# Patient Record
Sex: MALE | Race: WHITE | URBAN - METROPOLITAN AREA
[De-identification: names, ages, dates, MRNs, and addresses within clinical notes are randomized per-mention and may not be internally consistent; named-entity substitution may affect disease eponyms.]

---

## 2019-12-03 ENCOUNTER — APPOINTMENT (RX ONLY)
Dept: URBAN - METROPOLITAN AREA CLINIC 61 | Facility: CLINIC | Age: 69
Setting detail: DERMATOLOGY
End: 2019-12-03

## 2019-12-03 DIAGNOSIS — D18.0 HEMANGIOMA: ICD-10-CM

## 2019-12-03 DIAGNOSIS — I87.2 VENOUS INSUFFICIENCY (CHRONIC) (PERIPHERAL): ICD-10-CM

## 2019-12-03 DIAGNOSIS — L82.1 OTHER SEBORRHEIC KERATOSIS: ICD-10-CM

## 2019-12-03 DIAGNOSIS — D69.2 OTHER NONTHROMBOCYTOPENIC PURPURA: ICD-10-CM

## 2019-12-03 DIAGNOSIS — L70.8 OTHER ACNE: ICD-10-CM

## 2019-12-03 DIAGNOSIS — L57.0 ACTINIC KERATOSIS: ICD-10-CM

## 2019-12-03 DIAGNOSIS — L81.4 OTHER MELANIN HYPERPIGMENTATION: ICD-10-CM

## 2019-12-03 DIAGNOSIS — Z71.89 OTHER SPECIFIED COUNSELING: ICD-10-CM

## 2019-12-03 DIAGNOSIS — D22 MELANOCYTIC NEVI: ICD-10-CM

## 2019-12-03 PROBLEM — D18.01 HEMANGIOMA OF SKIN AND SUBCUTANEOUS TISSUE: Status: ACTIVE | Noted: 2019-12-03

## 2019-12-03 PROBLEM — D22.9 MELANOCYTIC NEVI, UNSPECIFIED: Status: ACTIVE | Noted: 2019-12-03

## 2019-12-03 PROCEDURE — ? FULL BODY SKIN EXAM

## 2019-12-03 PROCEDURE — ? COUNSELING

## 2019-12-03 PROCEDURE — 99203 OFFICE O/P NEW LOW 30 MIN: CPT | Mod: 25

## 2019-12-03 PROCEDURE — ? LIQUID NITROGEN

## 2019-12-03 PROCEDURE — ? EXTRACTIONS

## 2019-12-03 PROCEDURE — 17000 DESTRUCT PREMALG LESION: CPT

## 2019-12-03 ASSESSMENT — LOCATION ZONE DERM
LOCATION ZONE: ARM
LOCATION ZONE: FACE
LOCATION ZONE: NOSE

## 2019-12-03 ASSESSMENT — LOCATION SIMPLE DESCRIPTION DERM
LOCATION SIMPLE: LEFT FOREARM
LOCATION SIMPLE: NOSE
LOCATION SIMPLE: LEFT FOREHEAD

## 2019-12-03 ASSESSMENT — LOCATION DETAILED DESCRIPTION DERM
LOCATION DETAILED: LEFT LATERAL FOREHEAD
LOCATION DETAILED: NASAL DORSUM
LOCATION DETAILED: LEFT VENTRAL PROXIMAL FOREARM

## 2019-12-03 NOTE — PROCEDURE: MIPS QUALITY
Quality 47: Advance Care Plan: Advance Care Planning discussed and documented; advance care plan or surrogate decision maker documented in the medical record.
Quality 130: Documentation Of Current Medications In The Medical Record: Current Medications Documented
Detail Level: Detailed
Quality 226: Preventive Care And Screening: Tobacco Use: Screening And Cessation Intervention: Patient screened for tobacco use and is an ex/non-smoker
Quality 431: Preventive Care And Screening: Unhealthy Alcohol Use - Screening: Patient screened for unhealthy alcohol use using a single question and scores less than 2 times per year
Quality 110: Preventive Care And Screening: Influenza Immunization: Influenza Immunization Administered during Influenza season
Quality 111:Pneumonia Vaccination Status For Older Adults: Pneumococcal Vaccination Previously Received

## 2019-12-03 NOTE — PROCEDURE: EXTRACTIONS
Render Number Of Lesions Treated: no
Detail Level: Detailed
Prep Text (Optional): Prior to removal the treatment areas were prepped in the usual fashion.
Consent was obtained and risks were reviewed including but not limited to scarring, infection, bleeding, scabbing, incomplete removal, and allergy to anesthesia.
Extraction Method: cotton-tipped applicators and gentle pressure
Post-Care Instructions: I reviewed with the patient in detail post-care instructions. Patient is to keep the treatment areas dry overnight, and then apply bacitracin twice daily until healed. Patient may apply hydrogen peroxide soaks to remove any crusting.
Acne Type: Comedonal Lesions

## 2021-08-03 ENCOUNTER — OFFICE VISIT (OUTPATIENT)
Dept: CARDIOLOGY CLINIC | Age: 71
End: 2021-08-03
Payer: OTHER GOVERNMENT

## 2021-08-03 VITALS
DIASTOLIC BLOOD PRESSURE: 84 MMHG | WEIGHT: 191 LBS | SYSTOLIC BLOOD PRESSURE: 127 MMHG | RESPIRATION RATE: 16 BRPM | HEART RATE: 74 BPM | TEMPERATURE: 98.1 F | OXYGEN SATURATION: 97 %

## 2021-08-03 DIAGNOSIS — Z01.818 PRE-OP TESTING: ICD-10-CM

## 2021-08-03 DIAGNOSIS — I25.10 CORONARY ARTERY DISEASE WITHOUT ANGINA PECTORIS, UNSPECIFIED VESSEL OR LESION TYPE, UNSPECIFIED WHETHER NATIVE OR TRANSPLANTED HEART: ICD-10-CM

## 2021-08-03 DIAGNOSIS — Z76.89 ENCOUNTER TO ESTABLISH CARE WITH NEW DOCTOR: ICD-10-CM

## 2021-08-03 DIAGNOSIS — R06.09 DOE (DYSPNEA ON EXERTION): Primary | ICD-10-CM

## 2021-08-03 PROCEDURE — 93000 ELECTROCARDIOGRAM COMPLETE: CPT | Performed by: INTERNAL MEDICINE

## 2021-08-03 PROCEDURE — 99204 OFFICE O/P NEW MOD 45 MIN: CPT | Performed by: INTERNAL MEDICINE

## 2021-08-03 RX ORDER — ALBUTEROL SULFATE 90 UG/1
AEROSOL, METERED RESPIRATORY (INHALATION)
COMMUNITY
Start: 2021-05-12 | End: 2022-05-13

## 2021-08-03 RX ORDER — CHLORTHALIDONE 25 MG/1
12.5 TABLET ORAL DAILY
Qty: 45 TABLET | Refills: 3 | Status: SHIPPED | OUTPATIENT
Start: 2021-08-03

## 2021-08-03 RX ORDER — ASPIRIN 81 MG/1
81 TABLET ORAL DAILY
Qty: 90 TABLET | Refills: 3 | Status: SHIPPED | OUTPATIENT
Start: 2021-08-03

## 2021-08-03 RX ORDER — OMEPRAZOLE 40 MG/1
CAPSULE, DELAYED RELEASE ORAL
COMMUNITY
Start: 2021-01-26 | End: 2022-01-27

## 2021-08-03 RX ORDER — ROSUVASTATIN CALCIUM 10 MG/1
5 TABLET, COATED ORAL DAILY
COMMUNITY
Start: 2021-04-08 | End: 2022-04-09

## 2021-08-03 NOTE — PROGRESS NOTES
[facility]    Mr. Virgen Jones is a 71-year-old male history of CAD, DJD, hypertension, hyperlipidemia, PTSD    Patient is here today for initial cardiac evaluation. He denies any prior history of MI or CHF  Patient was asked to come see me for cardiac evaluation prior to considering lower back surgery  Patient has functional limitation because of back pain rating down to the leg. He surgery has not been scheduled however because of cardiac history he was asked to come see me    Patient tells me that he had a cardiac catheterization in 2006 in [place]. He was told that he had 75% blockage of one artery and 25% blockage in other artery. Details are not available. He did not have any angioplasty or coronary stent according to patient. He has been on medical management. He also tells me that about 9 months ago he had an echo cardiogram at [address] [place] [address] and he was told that he has mild to moderate pericardial effusion however no further work-up was needed according to patient. Patient does have dyspnea on exertion after walking probably 12 block. He also has been having on and off lower extremity edema especially at the ankle. He takes antihypertensive medication amlodipine. He denies PND. Has not had any chest pain or chest tightness however he does have dyspnea  Denies any nausea, vomiting, abdominal pain, fever, chills, sputum production. No hematuria or other bleeding complaints    Past Medical History:   Diagnosis Date    Asthma     CAD (coronary artery disease)     DJD (degenerative joint disease)     HLD (hyperlipidemia)     HTN (hypertension)     Lumbar stenosis     PTSD (post-traumatic stress disorder)        Review of Systems:  Cardiac symptoms as noted above in HPI. All others negative.   Denies fatigue, malaise, skin rash, joint pain, blurring vision, photophobia, neck pain, hemoptysis, chronic cough, nausea, vomiting, hematuria, burning micturition, BRBPR, chronic headaches. Current Outpatient Medications   Medication Sig    albuterol (ProAir HFA) 90 mcg/actuation inhaler INHALE 2 PUFFS BY INHALATION FOUR TIMES A DAY AS NEEDED FOR BREATHING    omeprazole (PRILOSEC) 40 mg capsule TAKE ONE CAPSULE BY MOUTH 2 TIMES A DAY    rosuvastatin (CRESTOR) 10 mg tablet TAKE ONE-HALF TABLET BY MOUTH EVERY OTHER DAY -FOR CHOLESTEROL     No current facility-administered medications for this visit. No past surgical history on file. Allergies and Sensitivities:  Allergies   Allergen Reactions    Atorvastatin Myalgia    Lisinopril Other (comments)     Eruption of skin       Family History:  No family history on file. Social History:  Social History     Tobacco Use    Smoking status: Not on file   Substance Use Topics    Alcohol use: Not on file    Drug use: Not on file     He  has no history on file for tobacco use. He  has no history on file for alcohol use. Physical Exam:  BP Readings from Last 3 Encounters:   08/03/21 (!) 144/87         Pulse Readings from Last 3 Encounters:   08/03/21 83          Wt Readings from Last 3 Encounters:   08/03/21 86.6 kg (191 lb)       Constitutional: Oriented to person, place, and time. HENT: Head: Normocephalic and atraumatic. Eyes: Conjunctivae and extraocular motions are normal.   Neck: No JVD present. Carotid bruit is not appreciated. Cardiovascular: Regular rhythm. No murmur, gallop or rubs appreciated  Lung: Breath sounds normal. No respiratory distress. No ronchi or rales appreciated  Abdominal: No tenderness. No rebound and no guarding. Musculoskeletal: There is 1+ ankle edema. No cynosis  Lymphadenopathy:  No cervical or supraclavicular adenopathy appriciated. Neurological: No gross motor deficit noted. Skin: No visible skin rash noted. No Ear discharge noted  Psychiatric: Normal mood and affect.      LABS:   @No results found for: WBC, WBCLT, HGBPOC, HGB, HGBP, HCTPOC, HCT, PHCT, RBCH, PLT, MCV, HGBEXT, HCTEXT, PLTEXT  No results found for: NA, K, CL, CO2, GLU, BUN, CREA  No flowsheet data found. No results found for: ALT  No results found for: HBA1C, KFK6YTCC, EHE4GLYH  No results found for: TSH, TSH2, TSH3, TSHP, TSHEXT    EK/3/21: Sinus rhythm at 78 bpm.  Right bundle branch block. Nonspecific changes    STRESS TEST (EST, PHARM, NUC, ECHO etc)    CATHETERIZATION    IMPRESSION & PLAN:  80-year-old male with multiple medical problem    CAD:  Patient tells me that he had a cardiac catheterization in  in [place]. He was told that he had 75% blockage of one artery and 25% blockage in other artery. Details are not available. He did not have any angioplasty or coronary stent according to patient. He has been on medical management. I am asking him that he undergoes nuclear stress test before elective surgery especially with his history of coronary  Start aspirin 81 mg daily  Recommend statin and possibly profile. This is being managed by PCP at [facility]    Hypertension:  144/87. He is on amlodipine at this time. Ankle edema could be from amlodipine however he would like to continue that. Start chlorthalidone 12.5 mg daily for hypertension and some edema  Ordering echo to rule out hypertensive cardiovascular heart disease and LV dysfunction especially with some fluid overload on exam    Pericardial effusion:  According to patient he had a pericardial effusion about 9 months ago at [facility]. Details are not available  He does not have JVD. His heart sounds is not muffled. EKG does not show low voltage  We will proceed with echo to evaluate pericardial effusion    Preoperative evaluation:  Patient is considering back surgery for his chronic back pain.   However because of his dyspnea history of moderate CAD, I recommend that he undergo nuclear stress test  Echo ordered to evaluate LVEF and rule out hypertensive cardiovascular heart disease and pericardial effusion  Surgery needs to be deferred and postpone until cardiac work-up is finished    This plan was discussed with patient who is in agreement. Thank you for allowing me to participate in patient care. Please feel free to call me if you have any question or concern. Michaela Aragon MD  Please note: This document has been produced using voice recognition software. Unrecognized errors in transcription may be present.

## 2021-08-03 NOTE — PROGRESS NOTES
Neeru Mancia presents today for   Chief Complaint   Patient presents with    Surgical Clearance       Neeru Mancia preferred language for health care discussion is english/other. Personal Protective Equipment:   Personal Protective Equipment was used including: mask-surgical and hands-gloves. Patient was placed on no precaution(s). Patient was masked. Precautions:   Patient currently on None  Patient currently roomed with door closed    Is someone accompanying this pt? no    Is the patient using any DME equipment during [address]? no    Depression Screening:  3 most recent PHQ Screens 8/3/2021   Little interest or pleasure in doing things Not at all   Feeling down, depressed, irritable, or hopeless Not at all   Total Score PHQ 2 0       Learning Assessment:  No flowsheet data found. Abuse Screening:  Completed    Fall Risk  Fall Risk Assessment, last 12 mths 8/3/2021   Able to walk? Yes   Fall in past 12 months? 0   Do you feel unsteady? 0   Are you worried about falling 0       Pt currently taking Anticoagulant therapy? no    Coordination of Care:  1. Have you been to the ER, urgent care clinic since your last visit? Hospitalized since your last visit? No    2. Have you seen or consulted any other health care providers outside of the [address] [place] [address] since your last visit? Include any pap smears or colon screening.   Yes [facility]

## 2021-08-03 NOTE — PATIENT INSTRUCTIONS
Testing   Echo   Nuclear Stress test      Nuclear Stress Instructions-Nothing to eat or drink past midnight and no medicaitons the morning of cardiac testing. **call office 3-5 days after testing is completed for results**     New Medication/Medication Changes  Start aspirin 81 mg a day  Start chlorthalidone 12.5 mg a day      **please allow 24-48 hrs for medication to be escribed to pharmacy** If you need any refills on medications please contact your pharmacy so that the request can be escribed to the provider for review.

## 2021-08-03 NOTE — LETTER
8/3/2021    Patient: Kecia Maki   YOB: 1950   Date of Visit: 8/3/2021     [address] [place] [address]  Via Fax: [phone]    Dear Dani Groves DO,      Thank you for referring Mr. Kecia Maki to CARDIO SPECIALIST AT [facility] for evaluation. My notes for this consultation are attached. If you have questions, please do not hesitate to call me. I look forward to following your patient along with you.       Sincerely,    Karon Vigil MD

## 2021-08-16 ENCOUNTER — HOSPITAL ENCOUNTER (OUTPATIENT)
Dept: NUCLEAR MEDICINE | Age: 71
Discharge: HOME | End: 2021-08-16
Attending: INTERNAL MEDICINE
Payer: OTHER GOVERNMENT

## 2021-08-16 ENCOUNTER — HOSPITAL ENCOUNTER (OUTPATIENT)
Dept: NON INVASIVE DIAGNOSTICS | Age: 71
Discharge: HOME | End: 2021-08-16
Attending: INTERNAL MEDICINE
Payer: OTHER GOVERNMENT

## 2021-08-16 ENCOUNTER — TELEPHONE (OUTPATIENT)
Dept: CARDIOLOGY CLINIC | Age: 71
End: 2021-08-16

## 2021-08-16 VITALS
BODY MASS INDEX: 31.82 KG/M2 | DIASTOLIC BLOOD PRESSURE: 103 MMHG | WEIGHT: 191 LBS | SYSTOLIC BLOOD PRESSURE: 179 MMHG | HEIGHT: 65 IN

## 2021-08-16 DIAGNOSIS — Z76.89 ENCOUNTER TO ESTABLISH CARE WITH NEW DOCTOR: ICD-10-CM

## 2021-08-16 DIAGNOSIS — Z01.818 PRE-OP TESTING: ICD-10-CM

## 2021-08-16 DIAGNOSIS — I25.10 CORONARY ARTERY DISEASE WITHOUT ANGINA PECTORIS, UNSPECIFIED VESSEL OR LESION TYPE, UNSPECIFIED WHETHER NATIVE OR TRANSPLANTED HEART: ICD-10-CM

## 2021-08-16 DIAGNOSIS — R06.09 DOE (DYSPNEA ON EXERTION): ICD-10-CM

## 2021-08-16 LAB
ECHO AO ROOT DIAM: 3.11 CM
ECHO AV AREA PEAK VELOCITY: 4.47 CM2
ECHO AV AREA VTI: 3.77 CM2
ECHO AV AREA/BSA PEAK VELOCITY: 2.3 CM2/M2
ECHO AV AREA/BSA VTI: 1.9 CM2/M2
ECHO AV MEAN GRADIENT: 5.51 MMHG
ECHO AV PEAK GRADIENT: 8.77 MMHG
ECHO AV PEAK VELOCITY: 148.08 CM/S
ECHO AV VTI: 30.52 CM
ECHO EST RA PRESSURE: 3 MMHG
ECHO IVC PROX: 2.19 CM
ECHO LA MAJOR AXIS: 3.32 CM
ECHO LA MINOR AXIS: 1.71 CM
ECHO LA VOL 2C: 52.58 ML (ref 18–58)
ECHO LA VOL 4C: 45.05 ML (ref 18–58)
ECHO LA VOL BP: 56.36 ML (ref 18–58)
ECHO LA VOL/BSA BIPLANE: 29.05 ML/M2 (ref 16–28)
ECHO LA VOLUME INDEX A2C: 27.1 ML/M2 (ref 16–28)
ECHO LA VOLUME INDEX A4C: 23.22 ML/M2 (ref 16–28)
ECHO LV E' LATERAL VELOCITY: 9 CM/S
ECHO LV E' SEPTAL VELOCITY: 8 CM/S
ECHO LV EDV A2C: 140.78 ML
ECHO LV EDV A4C: 133.3 ML
ECHO LV EDV BP: 137.69 ML (ref 67–155)
ECHO LV EDV INDEX A4C: 68.7 ML/M2
ECHO LV EDV INDEX BP: 71 ML/M2
ECHO LV EDV NDEX A2C: 72.6 ML/M2
ECHO LV EJECTION FRACTION A2C: 64 PERCENT
ECHO LV EJECTION FRACTION A4C: 47 PERCENT
ECHO LV EJECTION FRACTION BIPLANE: 54.3 PERCENT (ref 55–100)
ECHO LV ESV A2C: 50.91 ML
ECHO LV ESV A4C: 70.59 ML
ECHO LV ESV BP: 62.99 ML (ref 22–58)
ECHO LV ESV INDEX A2C: 26.2 ML/M2
ECHO LV ESV INDEX A4C: 36.4 ML/M2
ECHO LV ESV INDEX BP: 32.5 ML/M2
ECHO LV INTERNAL DIMENSION DIASTOLIC: 5.09 CM (ref 4.2–5.9)
ECHO LV INTERNAL DIMENSION SYSTOLIC: 3.46 CM
ECHO LV IVSD: 1.27 CM (ref 0.6–1)
ECHO LV MASS 2D: 246.1 G (ref 88–224)
ECHO LV MASS INDEX 2D: 126.9 G/M2 (ref 49–115)
ECHO LV POSTERIOR WALL DIASTOLIC: 1.17 CM (ref 0.6–1)
ECHO LVOT DIAM: 2.52 CM
ECHO LVOT PEAK GRADIENT: 7.04 MMHG
ECHO LVOT PEAK VELOCITY: 132.68 CM/S
ECHO LVOT SV: 115.1 ML
ECHO LVOT SV: 89.9 ML
ECHO LVOT VTI: 23.08 CM
ECHO MV A VELOCITY: 87.59 CM/S
ECHO MV AREA PHT: 3.69 CM2
ECHO MV E DECELERATION TIME (DT): 205.45 MS
ECHO MV E VELOCITY: 68.42 CM/S
ECHO MV E/A RATIO: 0.78
ECHO MV E/E' LATERAL: 7.6
ECHO MV E/E' RATIO (AVERAGED): 8.08
ECHO MV E/E' SEPTAL: 8.55
ECHO MV PRESSURE HALF TIME (PHT): 59.58 MS
ECHO RA AREA 4C: 19.01 CM2
ECHO RV INTERNAL DIMENSION: 3.94 CM
ECHO RV TAPSE: 2.8 CM (ref 1.5–2)
ECHO TV REGURGITANT MAX VELOCITY: 246.29 CM/S
ECHO TV REGURGITANT PEAK GRADIENT: 24.26 MMHG
LVOT MG: 3.44 MMHG
STRESS BASELINE HR: 75 BPM
STRESS ESTIMATED WORKLOAD: 1 METS
STRESS EXERCISE DUR MIN: NORMAL
STRESS PEAK DIAS BP: 92 MMHG
STRESS PEAK SYS BP: 166 MMHG
STRESS PERCENT HR ACHIEVED: 67 %
STRESS POST PEAK HR: 100 BPM
STRESS RATE PRESSURE PRODUCT: NORMAL BPM*MMHG
STRESS ST DEPRESSION: 0 MM
STRESS ST ELEVATION: 0 MM
STRESS TARGET HR: 150 BPM

## 2021-08-16 PROCEDURE — A9500 TC99M SESTAMIBI: HCPCS

## 2021-08-16 PROCEDURE — 93306 TTE W/DOPPLER COMPLETE: CPT

## 2021-08-16 PROCEDURE — 74011250636 HC RX REV CODE- 250/636: Performed by: INTERNAL MEDICINE

## 2021-08-16 PROCEDURE — 93017 CV STRESS TEST TRACING ONLY: CPT

## 2021-08-16 RX ADMIN — REGADENOSON 0.4 MG: 0.08 INJECTION, SOLUTION INTRAVENOUS at 10:44

## 2021-08-16 NOTE — TELEPHONE ENCOUNTER
----- Message from Jamie Foote MD sent at 8/16/2021  1:26 PM EDT -----  Cardiac testing appears normal.  Inform patient please    Thanks  SP

## 2021-08-16 NOTE — TELEPHONE ENCOUNTER
Attempted to contact pt at  number, no answer. Lvm for pt to return call to office at [phone]. Will continue to try to contact pt.        Re: Echo

## 2021-08-16 NOTE — LETTER
8/17/2021     Mr. Rohan [address]rick [address] [place] [id number]    To whom it may concern:     Rohan Dunham was seen in our office on August 3, 2021  for cardiac evaluation. From a cardiac standpoint he is low to intermediate risk for his upcoming back surgery. It is my recommendation that he continue his aspirin 81 mg tab kristin-operatively if the surgeon is agreeable. Please feel free to contact our office if you have any questions regarding this patient.          Sincerely,      Meeta Daigle MD

## 2021-08-17 NOTE — TELEPHONE ENCOUNTER
Contacted pt at [facility] number. Two patient Identifiers confirmed. Advised pt per Dr Herlinda England. Pt verbalized understanding and inquired if he was cleared for back surgery. Advised I would confirm with Dr Herlinda England.        Re: Dr Isabelle Stearns ( Ortho) [phone] ( V)  [phone]( ph)

## 2021-08-18 ENCOUNTER — TELEPHONE (OUTPATIENT)
Dept: CARDIOLOGY CLINIC | Age: 71
End: 2021-08-18

## 2021-08-18 NOTE — TELEPHONE ENCOUNTER
----- Message from Azael Vargas MD sent at 8/17/2021  1:05 PM EDT -----  Cardiac testing appears normal.  Inform patient please    Thanks  SP

## 2021-08-24 ENCOUNTER — OFFICE VISIT (OUTPATIENT)
Dept: CARDIOLOGY CLINIC | Age: 71
End: 2021-08-24
Payer: OTHER GOVERNMENT

## 2021-08-24 VITALS
SYSTOLIC BLOOD PRESSURE: 133 MMHG | HEIGHT: 65 IN | TEMPERATURE: 98.3 F | DIASTOLIC BLOOD PRESSURE: 78 MMHG | OXYGEN SATURATION: 99 % | HEART RATE: 80 BPM | BODY MASS INDEX: 31.19 KG/M2 | WEIGHT: 187.2 LBS

## 2021-08-24 DIAGNOSIS — I10 ESSENTIAL HYPERTENSION WITH GOAL BLOOD PRESSURE LESS THAN 140/90: ICD-10-CM

## 2021-08-24 DIAGNOSIS — I25.10 CORONARY ARTERY DISEASE WITHOUT ANGINA PECTORIS, UNSPECIFIED VESSEL OR LESION TYPE, UNSPECIFIED WHETHER NATIVE OR TRANSPLANTED HEART: Primary | ICD-10-CM

## 2021-08-24 DIAGNOSIS — Z01.818 PRE-OP EVALUATION: ICD-10-CM

## 2021-08-24 PROCEDURE — 99214 OFFICE O/P EST MOD 30 MIN: CPT | Performed by: INTERNAL MEDICINE

## 2021-08-24 NOTE — PROGRESS NOTES
Cardiovascular Specialists    Mr. Yamile Goncalves is a 80 y.o. male history of CAD, DJD, hypertension, hyperlipidemia, PTSD    Patient is here today for  cardiac evaluation. He denies any prior history of MI or CHF  Patient was asked to come see me for cardiac evaluation prior to considering lower back surgery  He has undergone echocardiogram and stress test since last time for preoperative evaluation. He had one episode of funny feeling in the chest lasting for few seconds however did not cause any other symptoms along with it. He has mild dyspnea on moderate exertion however this has remained unchanged. Denies any nausea, vomiting, abdominal pain, fever, chills, sputum production. No hematuria or other bleeding complaints    Past Medical History:   Diagnosis Date    Asthma     CAD (coronary artery disease)     DJD (degenerative joint disease)     HLD (hyperlipidemia)     HTN (hypertension)     Lumbar stenosis     PTSD (post-traumatic stress disorder)        Review of Systems:  Cardiac symptoms as noted above in HPI. All others negative. Denies fatigue, malaise, skin rash, joint pain, blurring vision, photophobia, neck pain, hemoptysis, chronic cough, nausea, vomiting, hematuria, burning micturition, BRBPR, chronic headaches. Current Outpatient Medications   Medication Sig    rosuvastatin (CRESTOR) 10 mg tablet Take 5 mg by mouth daily.  aspirin delayed-release 81 mg tablet Take 1 Tablet by mouth daily.  chlorthalidone (HYGROTON) 25 mg tablet Take 0.5 Tablets by mouth daily.  albuterol (ProAir HFA) 90 mcg/actuation inhaler INHALE 2 PUFFS BY INHALATION FOUR TIMES A DAY AS NEEDED FOR BREATHING    omeprazole (PRILOSEC) 40 mg capsule TAKE ONE CAPSULE BY MOUTH 2 TIMES A DAY     No current facility-administered medications for this visit. No past surgical history on file.     Allergies and Sensitivities:  Allergies   Allergen Reactions    Atorvastatin Myalgia    Lisinopril Other (comments)     Eruption of skin    Sulfa (Sulfonamide Antibiotics) Other (comments)     As a child. Patient does not remember reaction       Family History:  No family history on file. Social History:  Social History     Tobacco Use    Smoking status: Former Smoker    Smokeless tobacco: Never Used   Substance Use Topics    Alcohol use: Not on file    Drug use: Not on file     He  reports that he has quit smoking. He has never used smokeless tobacco.  He  has no history on file for alcohol use. Physical Exam:  BP Readings from Last 3 Encounters:   21 133/78   21 (!) 179/103   21 127/84         Pulse Readings from Last 3 Encounters:   21 80   21 74          Wt Readings from Last 3 Encounters:   21 84.9 kg (187 lb 3.2 oz)   21 86.6 kg (191 lb)   21 86.6 kg (191 lb)       Constitutional: Oriented to person, place, and time. HENT: Head: Normocephalic and atraumatic. Neck: No JVD present. Carotid bruit is not appreciated. Cardiovascular: Regular rhythm. No murmur, gallop or rubs appreciated  Lung: Breath sounds normal. No respiratory distress. No ronchi or rales appreciated  Abdominal: No tenderness. No rebound and no guarding. Musculoskeletal: There is trace + ankle edema. No cynosis    LABS:   @No results found for: WBC, WBCLT, HGBPOC, HGB, HGBP, HCTPOC, HCT, PHCT, RBCH, PLT, MCV, HGBEXT, HCTEXT, PLTEXT, HGBEXT, HCTEXT, PLTEXT  No results found for: NA, K, CL, CO2, GLU, BUN, CREA  No flowsheet data found. No results found for: ALT  No results found for: HBA1C, RAK7YGEZ, WXV1UZMQ, YQB2IZTH  No results found for: TSH, TSH2, TSH3, TSHP, TSHEXT, TSHEXT    EK/3/21: Sinus rhythm at 78 bpm.  Right bundle branch block. Nonspecific changes    21    ECHO ADULT COMPLETE 2021  Interpretation Summary  · LV: Estimated LVEF is 50 - 55%.  Normal cavity size and systolic function (ejection fraction normal). Increased wall thickness. Mild (grade 1) left ventricular diastolic dysfunction E'E= 8.08.  · MV: Mild mitral valve regurgitation is present. · TV: Mild tricuspid valve regurgitation is present. · PV: Mild pulmonic valve regurgitation is present. · PA: Pulmonary arterial systolic pressure is 27 mmHg. · Pericardium: Small pericardial effusion noted. Effusion is located adjacent to the right ventricle. Effusion contains focal strands. No indications of tamponade present. NUCLEAR CARDIAC STRESS TEST 08/16/2021 8/16/2021  Interpretation Summary  · Baseline ECG: Sinus rhythm, right bundle branch block. · Stress test: Negative stress test. Exercise stress test: EKG stress test results correlate with a low risk of inducible myocardial ischemia. · SPECT: Left ventricular function post-stress was normal. Calculated ejection fraction is 67%. There is no evidence of transient ischemic dilation (TID). The TID ratio is 1. 16.  · SPECT: Myocardial perfusion imaging defect 1: caused by soft tissue. · SPECT: Left ventricular perfusion is probably normal. Myocardial perfusion imaging supports a low risk stress test.    IMPRESSION & PLAN:  67-year-old male with multiple medical problem    CAD:  Patient tells me that he had a cardiac catheterization in 2006 in [place]. He was told that he had 75% blockage of one artery and 25% blockage in other artery. Details are not available. He did not have any angioplasty or coronary stent according to patient. He has been on medical management. Nuclear stress test and echocardiogram in 08/2021, low risk  Currently on aspirin, chlorthalidone and Crestor. Continue same    Hypertension:  133/78. He is currently on chlorthalidone 12.5 mg daily    Pericardial effusion:  According to patient he had a pericardial effusion about 9 months ago at [facility]. Details are not available  Echocardiogram in 08/2021 showed small pericardial effusion without any tamponade. Continue with clinical observation    Preoperative evaluation:  Patient is considering back surgery for his chronic back pain. However because of his dyspnea history of moderate CAD, I recommend that he undergo nuclear stress test  Echocardiogram and nuclear stress test were low risk test in 08/2021  He does not have any unstable coronary symptoms or decompensated heart failure. No significant edema. He is acceptable candidate to undergo planned orthopedic back surgery without any further cardiac work-up  He would be lowintermediate risk    This plan was discussed with patient who is in agreement. Thank you for allowing me to participate in patient care. Please feel free to call me if you have any question or concern. Nhan Garza MD  Please note: This document has been produced using voice recognition software. Unrecognized errors in transcription may be present.

## 2021-08-24 NOTE — PROGRESS NOTES
Roman Cardenas presents today for   Chief Complaint   Patient presents with    Follow-up       Roman Cardenas preferred language for health care discussion is english. Personal Protective Equipment:   Personal Protective Equipment was used including: mask-surgical and hands-gloves. Patient was placed on no precaution(s). Patient was masked. Precautions:   Patient currently on None  Patient currently roomed with door closed    Is someone accompanying this pt? no    Is the patient using any DME equipment during [address]? no    Depression Screening:  3 most recent PHQ Screens 8/3/2021   Little interest or pleasure in doing things Not at all   Feeling down, depressed, irritable, or hopeless Not at all   Total Score PHQ 2 0       Learning Assessment:  No flowsheet data found. Abuse Screening:  Abuse Screening Questionnaire 8/3/2021   Do you ever feel afraid of your partner? N   Are you in a relationship with someone who physically or mentally threatens you? N   Is it safe for you to go home? Y       Fall Risk  Fall Risk Assessment, last 12 mths 8/3/2021   Able to walk? Yes   Fall in past 12 months? 0   Do you feel unsteady? 0   Are you worried about falling 0       Pt currently taking Anticoagulant therapy? no    Coordination of Care:  1. Have you been to the ER, urgent care clinic since your last visit? Hospitalized since your last visit? no    2. Have you seen or consulted any other health care providers outside of the [address] [place] [address] since your last visit? Include any pap smears or colon screening.  no

## 2021-08-24 NOTE — LETTER
8/24/2021    Patient: Lalita Pratt   YOB: 1950   Date of Visit: 8/24/2021     [address] [place] [address]  Via Fax: [phone]    Dear Pablo Aguilera DO,      Thank you for referring Mr. Mamta Ferro to CARDIO SPECIALIST AT [facility] for evaluation. My notes for this consultation are attached. If you have questions, please do not hesitate to call me. I look forward to following your patient along with you.       Sincerely,    Kaz Gonzalez MD

## 2023-12-08 ENCOUNTER — TELEPHONE (OUTPATIENT)
Age: 73
End: 2023-12-08

## 2023-12-08 NOTE — TELEPHONE ENCOUNTER
Call and left generic voicemail if patient returns call please informed patient of JOSE Villa message. ..... Francia Plants If he has a general question, I can answer that but if he wants to have an in depth conversation, he will need an appointment. He hasn't been seen by us yet, so he would need to come in before we could do a telehealth appointment.

## 2023-12-08 NOTE — TELEPHONE ENCOUNTER
New Patient returned call. Patient said he is in Excruciating pain on his Left hip. That he can hardly walk. That he knows he will be needing surgery. Patient would like to know if their is anyway  could work him in to be seen as soon as possible at the [place] location, and he would also like to know what is involved in a Hip Replacement. Patient is asking for a call back at   [facility]. [phone]. Note : please see previous messages.